# Patient Record
Sex: FEMALE | Race: WHITE
[De-identification: names, ages, dates, MRNs, and addresses within clinical notes are randomized per-mention and may not be internally consistent; named-entity substitution may affect disease eponyms.]

---

## 2018-02-08 ENCOUNTER — HOSPITAL ENCOUNTER (OUTPATIENT)
Dept: HOSPITAL 95 - ORSCSDS | Age: 58
Discharge: HOME | End: 2018-02-08
Payer: COMMERCIAL

## 2018-02-08 VITALS — WEIGHT: 232.37 LBS | HEIGHT: 65 IN | BODY MASS INDEX: 38.71 KG/M2

## 2018-02-08 DIAGNOSIS — Z87.891: ICD-10-CM

## 2018-02-08 DIAGNOSIS — H25.11: Primary | ICD-10-CM

## 2018-02-08 DIAGNOSIS — I10: ICD-10-CM

## 2018-02-08 DIAGNOSIS — E66.9: ICD-10-CM

## 2018-02-08 DIAGNOSIS — E11.9: ICD-10-CM

## 2018-02-08 DIAGNOSIS — Z79.899: ICD-10-CM

## 2018-02-08 DIAGNOSIS — E78.5: ICD-10-CM

## 2018-02-08 DIAGNOSIS — Z79.82: ICD-10-CM

## 2018-02-08 DIAGNOSIS — Z86.73: ICD-10-CM

## 2018-02-08 PROCEDURE — V2632 POST CHMBR INTRAOCULAR LENS: HCPCS

## 2018-02-08 PROCEDURE — 08RJ3JZ REPLACEMENT OF RIGHT LENS WITH SYNTHETIC SUBSTITUTE, PERCUTANEOUS APPROACH: ICD-10-PCS

## 2019-03-01 ENCOUNTER — HOSPITAL ENCOUNTER (EMERGENCY)
Dept: HOSPITAL 95 - ER | Age: 59
Discharge: HOME | End: 2019-03-01
Payer: SELF-PAY

## 2019-03-01 VITALS — BODY MASS INDEX: 36.16 KG/M2 | WEIGHT: 225 LBS | HEIGHT: 66 IN

## 2019-03-01 DIAGNOSIS — E11.9: ICD-10-CM

## 2019-03-01 DIAGNOSIS — R20.8: Primary | ICD-10-CM

## 2019-03-01 DIAGNOSIS — I10: ICD-10-CM

## 2019-03-01 DIAGNOSIS — Z87.891: ICD-10-CM

## 2019-03-01 DIAGNOSIS — Z79.82: ICD-10-CM

## 2019-03-01 DIAGNOSIS — Z88.0: ICD-10-CM

## 2019-03-01 DIAGNOSIS — Z79.899: ICD-10-CM

## 2019-03-01 LAB
ALBUMIN SERPL BCP-MCNC: 3.9 G/DL (ref 3.4–5)
ALBUMIN/GLOB SERPL: 0.9 {RATIO} (ref 0.8–1.8)
ALT SERPL W P-5'-P-CCNC: 33 U/L (ref 12–78)
ANION GAP SERPL CALCULATED.4IONS-SCNC: 8 MMOL/L (ref 6–16)
AST SERPL W P-5'-P-CCNC: 25 U/L (ref 12–37)
BASOPHILS # BLD AUTO: 0.03 K/MM3 (ref 0–0.23)
BASOPHILS NFR BLD AUTO: 0 % (ref 0–2)
BILIRUB SERPL-MCNC: 0.4 MG/DL (ref 0.1–1)
BUN SERPL-MCNC: 19 MG/DL (ref 8–24)
CALCIUM SERPL-MCNC: 9.3 MG/DL (ref 8.5–10.1)
CHLORIDE SERPL-SCNC: 105 MMOL/L (ref 98–108)
CO2 SERPL-SCNC: 27 MMOL/L (ref 21–32)
CREAT SERPL-MCNC: 1.01 MG/DL (ref 0.4–1)
DEPRECATED RDW RBC AUTO: 39.7 FL (ref 35.1–46.3)
EOSINOPHIL # BLD AUTO: 0.23 K/MM3 (ref 0–0.68)
EOSINOPHIL NFR BLD AUTO: 3 % (ref 0–6)
ERYTHROCYTE [DISTWIDTH] IN BLOOD BY AUTOMATED COUNT: 12.5 % (ref 11.7–14.2)
GLOBULIN SER CALC-MCNC: 4.2 G/DL (ref 2.2–4)
GLUCOSE SERPL-MCNC: 157 MG/DL (ref 70–99)
HCT VFR BLD AUTO: 41.9 % (ref 33–51)
HGB BLD-MCNC: 13.2 G/DL (ref 11.5–16)
IMM GRANULOCYTES # BLD AUTO: 0.02 K/MM3 (ref 0–0.1)
IMM GRANULOCYTES NFR BLD AUTO: 0 % (ref 0–1)
LYMPHOCYTES # BLD AUTO: 2.75 K/MM3 (ref 0.84–5.2)
LYMPHOCYTES NFR BLD AUTO: 35 % (ref 21–46)
MCHC RBC AUTO-ENTMCNC: 31.5 G/DL (ref 31.5–36.5)
MCV RBC AUTO: 87 FL (ref 80–100)
MONOCYTES # BLD AUTO: 0.39 K/MM3 (ref 0.16–1.47)
MONOCYTES NFR BLD AUTO: 5 % (ref 4–13)
NEUTROPHILS # BLD AUTO: 4.42 K/MM3 (ref 1.96–9.15)
NEUTROPHILS NFR BLD AUTO: 56 % (ref 41–73)
NRBC # BLD AUTO: 0 K/MM3 (ref 0–0.02)
NRBC BLD AUTO-RTO: 0 /100 WBC (ref 0–0.2)
PLATELET # BLD AUTO: 267 K/MM3 (ref 150–400)
POTASSIUM SERPL-SCNC: 3.7 MMOL/L (ref 3.5–5.5)
PROT SERPL-MCNC: 8.1 G/DL (ref 6.4–8.2)
SODIUM SERPL-SCNC: 140 MMOL/L (ref 136–145)
TROPONIN I SERPL-MCNC: <0.015 NG/ML (ref 0–0.04)

## 2019-09-30 NOTE — NUR
Ambulatory in Day Surgery.
History, Chart, Medications and Allergies reviewed before start of
procedure. Lungs clear T/O to Auscultation.
Patient confirms NPO status and agrees with scheduled surgery.
Pre-Op teaching done. Pt verbalizes understanding.
Patient reports completing Chlorhexadine shower X2 prior to admission to
hospital.

## 2019-09-30 NOTE — NUR
SHIFT SUMMARY
POD 0 L TKA. DRESSING C/D/I WITH POLAR PACK ON. DENIES PAIN. UNABLE TO GET OUT
OF BED YET D/T NUMBNESS IN BLE. DID NOT WORK WITH THERAPY R/T NUMBNESS. NO
VOID, BLADDER SCAN PREFORMED AND SHOWED 194ML. TOLERATING SMALL AMOUNTS OF PO.
CALL LIGHT WITHIN REACH AND FAMILY AT BEDSIDE.

## 2019-10-01 NOTE — NUR
WORKED WITH THERAPY THIS MORNING. DISCUSSED FREQUENT SHORT AMULATION IN
HALLWAY AND ANKLE PUMPS. THERAPY PLANS TO RETURN AND WORK WITH PT AGAIN THIS
AFTERNOON.

## 2019-10-01 NOTE — NUR
SHIFT SUMMARY
PT BEEN RESTING QUIETLY. PT BEEN ASSISTED WITH ADL'S PRN. PT VOIDING. PT BEEN
MED PRN PAIN.

## 2019-10-01 NOTE — NUR
SHIFT SUMMARY
PT D/C HOME WITH SPOUSE VIA WHEELCHAIR AT APPROX [id number]. DISCHARGE INSTRUCTIONS
GIVEN TO PT AND SPOUSE, ALONG WITH SCRIPT AND PERSONAL BELONGINGS. OTHER
SCRIPT CALLED INTO PHARMACY OF PT CHOICE. PT AND FAMILY VERBALIZED
UNDERSTANDING ADN DECLINED ANY FURTHER CONCERNS AT THIS TIME.

## 2019-10-01 NOTE — NUR
Patient is in the discharge process but asks for a prayer of blessing on their
way out. I gladly provide prayer. Patient and family voice appreciation.